# Patient Record
Sex: MALE | Race: ASIAN | NOT HISPANIC OR LATINO | Employment: OTHER | URBAN - METROPOLITAN AREA
[De-identification: names, ages, dates, MRNs, and addresses within clinical notes are randomized per-mention and may not be internally consistent; named-entity substitution may affect disease eponyms.]

---

## 2017-01-11 ENCOUNTER — GENERIC CONVERSION - ENCOUNTER (OUTPATIENT)
Dept: OTHER | Facility: OTHER | Age: 42
End: 2017-01-11

## 2017-01-11 DIAGNOSIS — Z13.1 ENCOUNTER FOR SCREENING FOR DIABETES MELLITUS: ICD-10-CM

## 2017-01-11 DIAGNOSIS — Z13.220 ENCOUNTER FOR SCREENING FOR LIPOID DISORDERS: ICD-10-CM

## 2017-02-24 ENCOUNTER — LAB CONVERSION - ENCOUNTER (OUTPATIENT)
Dept: OTHER | Facility: OTHER | Age: 42
End: 2017-02-24

## 2017-02-24 LAB
A/G RATIO (HISTORICAL): 1.7 (CALC) (ref 1–2.5)
ALBUMIN SERPL BCP-MCNC: 4.3 G/DL (ref 3.6–5.1)
ALP SERPL-CCNC: 75 U/L (ref 40–115)
ALT SERPL W P-5'-P-CCNC: 43 U/L (ref 9–46)
AST SERPL W P-5'-P-CCNC: 26 U/L (ref 10–40)
BILIRUB SERPL-MCNC: 0.6 MG/DL (ref 0.2–1.2)
BUN SERPL-MCNC: 11 MG/DL (ref 7–25)
BUN/CREA RATIO (HISTORICAL): ABNORMAL (CALC) (ref 6–22)
CALCIUM SERPL-MCNC: 9.5 MG/DL (ref 8.6–10.3)
CHLORIDE SERPL-SCNC: 102 MMOL/L (ref 98–110)
CHOLEST SERPL-MCNC: 153 MG/DL (ref 125–200)
CHOLEST/HDLC SERPL: 4.1 (CALC)
CO2 SERPL-SCNC: 29 MMOL/L (ref 20–31)
CREAT SERPL-MCNC: 0.76 MG/DL (ref 0.6–1.35)
EGFR AFRICAN AMERICAN (HISTORICAL): 130 ML/MIN/1.73M2
EGFR-AMERICAN CALC (HISTORICAL): 113 ML/MIN/1.73M2
GAMMA GLOBULIN (HISTORICAL): 2.6 G/DL (CALC) (ref 1.9–3.7)
GLUCOSE (HISTORICAL): 181 MG/DL (ref 65–99)
HDLC SERPL-MCNC: 37 MG/DL
LDL CHOLESTEROL (HISTORICAL): 94 MG/DL (CALC)
NON-HDL-CHOL (CHOL-HDL) (HISTORICAL): 116 MG/DL (CALC)
POTASSIUM SERPL-SCNC: 4.2 MMOL/L (ref 3.5–5.3)
SODIUM SERPL-SCNC: 139 MMOL/L (ref 135–146)
TOTAL PROTEIN (HISTORICAL): 6.9 G/DL (ref 6.1–8.1)
TRIGL SERPL-MCNC: 111 MG/DL

## 2017-02-28 ENCOUNTER — GENERIC CONVERSION - ENCOUNTER (OUTPATIENT)
Dept: OTHER | Facility: OTHER | Age: 42
End: 2017-02-28

## 2017-03-07 ENCOUNTER — ALLSCRIPTS OFFICE VISIT (OUTPATIENT)
Dept: OTHER | Facility: OTHER | Age: 42
End: 2017-03-07

## 2017-03-07 DIAGNOSIS — E11.65 TYPE 2 DIABETES MELLITUS WITH HYPERGLYCEMIA (HCC): ICD-10-CM

## 2017-03-07 LAB — GLUCOSE SERPL-MCNC: 327 MG/DL

## 2017-03-23 ENCOUNTER — GENERIC CONVERSION - ENCOUNTER (OUTPATIENT)
Dept: OTHER | Facility: OTHER | Age: 42
End: 2017-03-23

## 2017-11-09 ENCOUNTER — APPOINTMENT (OUTPATIENT)
Dept: LAB | Facility: CLINIC | Age: 42
End: 2017-11-09
Payer: COMMERCIAL

## 2017-11-09 ENCOUNTER — TRANSCRIBE ORDERS (OUTPATIENT)
Dept: LAB | Facility: CLINIC | Age: 42
End: 2017-11-09

## 2017-11-09 DIAGNOSIS — R73.03 DIABETES MELLITUS, LATENT: ICD-10-CM

## 2017-11-09 DIAGNOSIS — E11.8 UNCONTROLLED TYPE 2 DIABETES MELLITUS WITH COMPLICATION, UNSPECIFIED LONG TERM INSULIN USE STATUS: ICD-10-CM

## 2017-11-09 DIAGNOSIS — E11.65 UNCONTROLLED TYPE 2 DIABETES MELLITUS WITH COMPLICATION, UNSPECIFIED LONG TERM INSULIN USE STATUS: ICD-10-CM

## 2017-11-09 DIAGNOSIS — E11.65 TYPE 2 DIABETES MELLITUS WITH HYPERGLYCEMIA (HCC): ICD-10-CM

## 2017-11-09 DIAGNOSIS — R73.01 IMPAIRED FASTING GLUCOSE: Primary | ICD-10-CM

## 2017-11-09 DIAGNOSIS — R73.01 IMPAIRED FASTING GLUCOSE: ICD-10-CM

## 2017-11-09 LAB
CREAT UR-MCNC: 136 MG/DL
EST. AVERAGE GLUCOSE BLD GHB EST-MCNC: 134 MG/DL
HBA1C MFR BLD: 6.3 % (ref 4.2–6.3)
MICROALBUMIN UR-MCNC: 15.2 MG/L (ref 0–20)
MICROALBUMIN/CREAT 24H UR: 11 MG/G CREATININE (ref 0–30)
TSH SERPL DL<=0.05 MIU/L-ACNC: 1.69 UIU/ML (ref 0.36–3.74)

## 2017-11-09 PROCEDURE — 84443 ASSAY THYROID STIM HORMONE: CPT

## 2017-11-09 PROCEDURE — 83036 HEMOGLOBIN GLYCOSYLATED A1C: CPT

## 2017-11-09 PROCEDURE — 84681 ASSAY OF C-PEPTIDE: CPT

## 2017-11-09 PROCEDURE — 82043 UR ALBUMIN QUANTITATIVE: CPT | Performed by: INTERNAL MEDICINE

## 2017-11-09 PROCEDURE — 82570 ASSAY OF URINE CREATININE: CPT | Performed by: INTERNAL MEDICINE

## 2017-11-09 PROCEDURE — 36415 COLL VENOUS BLD VENIPUNCTURE: CPT

## 2017-11-10 LAB — C PEPTIDE SERPL-MCNC: 8.8 NG/ML (ref 1.1–4.4)

## 2017-11-14 ENCOUNTER — ALLSCRIPTS OFFICE VISIT (OUTPATIENT)
Dept: OTHER | Facility: OTHER | Age: 42
End: 2017-11-14

## 2017-11-14 DIAGNOSIS — E11.65 TYPE 2 DIABETES MELLITUS WITH HYPERGLYCEMIA (HCC): ICD-10-CM

## 2017-11-15 NOTE — PROGRESS NOTES
Assessment    1  Diet-controlled diabetes mellitus (250 00) (E11 9)    Plan  Diet-controlled diabetes mellitus    · Accu-Chek Combo KIT; USE AS DIRECTED  Uncontrolled diabetes mellitus    · (1) COMPREHENSIVE METABOLIC PANEL; Status:Active; Requested for:14Nov2017;    · (1) LIPID PANEL FASTING W DIRECT LDL REFLEX; Status:Active; Requested for:14Nov2017;    · *VB - Foot Exam; Status:Complete;   Done: 93OGE0233 08:56AM    Discussion/Summary  Discussion Summary:   1  fasting blood workrecommend flu vaccinereturn in March 2018  Counseling Documentation With Imm: The patient was counseled regarding diagnostic results,-- instructions for management,-- patient and family education,-- impressions,-- risks and benefits of treatment options,-- importance of compliance with treatment  Medication SE Review and Pt Understands Tx: Possible side effects of new medications were reviewed with the patient/guardian today  The treatment plan was reviewed with the patient/guardian  The patient/guardian understands and agrees with the treatment plan      Chief Complaint  Chief Complaint Chronic Condition [facility]: Patient is here today for follow up of chronic conditions described in HPI  History of Present Illness  HPI: 35 y/o M here for follow up  above - taking no prescribed meds on regular basisseen 8 mos ago when had BS in 300s in [place] return to office or complete BW till now, reports he was traveling for work during last several mos  BS at home and running in 100-110staking metformin, lost 10-15 lbs since last OV with meand modified his diet, eating a 'heavy fat diet'  eye exam scheduled for next weekreviewed most recent BW today in office  takes several ayurvedic/herbal medications OTC but he did not specify which ones  flu vaccine and prefers not to take any prescribed medicationsother complaints      Review of Systems  Complete-Male:  Constitutional: no fever  Eyes: eyesight problems-- and-- wears glasses    ENT: no sore throat  Cardiovascular: no chest pain  Respiratory: no shortness of breath  Gastrointestinal: no abdominal pain  Genitourinary: no dysuria  Musculoskeletal: no arthralgias  Integumentary: no rashes  Neurological: no confusion  Psychiatric: no depression  Endocrine: no feelings of weakness  ROS Reviewed:   ROS reviewed  Active Problems    1  Acute pharyngitis (462) (J02 9)   2  Acute upper respiratory infection (465 9) (J06 9)   3  Chronic allergic conjunctivitis (372 14) (H10 45)   4  Daytime sleepiness (780 54) (R40 0)   5  Elevated fasting blood sugar (790 21) (R73 01)   6  Female infertility (628 9) (N97 9)   7  Ganglion (727 43) (M67 40)   8  Hemorrhoids (455 6) (K64 9)   9  Joint pain, knee (719 46) (M25 569)   10  Laboratory examination ordered as part of a routine general medical examination (V72 62) (Z00 00)   11  Left ear pain (388 70) (H92 02)   12  Left wrist injury (959 3) (S69 92XA)   13  Otalgia, unspecified laterality (388 70) (H92 09)   14  Postnasal drip (784 91) (R09 82)   15  Screening for diabetes mellitus (V77 1) (Z13 1)   16  Screening for hyperlipidemia (V77 91) (Z13 220)   17  Skin lesion (709 9) (L98 9)   18  Snoring (786 09) (R06 83)   19  Sore throat (462) (J02 9)   20  Well adult on routine health check (V70 0) (Z00 00)    Past Medical History  1  No pertinent past medical history  Active Problems And Past Medical History Reviewed: The active problems and past medical history were reviewed and updated today  Surgical History  1  Denied: History of Recent Surgery    Family History  Mother    1  Family history of diabetes mellitus (V18 0) (Z83 3)   2  Family history of heart disease (V17 49) (Z82 49)  Father    3  Family history of cerebrovascular accident (V17 1) (Z82 3)   4  Family history of diabetes mellitus (V18 0) (Z83 3)   5   Family history of heart disease (V17 49) (Z82 49)    Social History     · Caffeine use (V49 89) (F15 90)   · Currently working   · Exercises regularly   ·    · Never a smoker  Social History Reviewed: The social history was reviewed and updated today  Current Meds   1  Multi-Vitamin Oral Tablet; TAKE 1 TABLET DAILY; Therapy: [id number] to Recorded  Medication List Reviewed: The medication list was reviewed and updated today  Allergies  1  No Known Drug Allergies    Vitals  Vital Signs    Recorded: 20OUV5633 08:51AM   Temperature 98 F   Heart Rate 94   Respiration 18   Systolic 446   Diastolic 84   Height 5 ft 4 in   Weight 152 lb    BMI Calculated 26 09   BSA Calculated 1 74   O2 Saturation 98       Physical Exam  Socks and shoes removed, Right Foot Findings: normal foot  The toes were normal  Socks and Shoes removed, Left Foot Findings: normal foot  The toes were normal    Monofilament Testing: Tactile sensation in the right foot (see image for location): number 1 was normal,-- number 4 was normal,-- number 5 was normal,-- number 6 was normal,-- number 2 was normal,-- number 3 was normal,-- number 7 was normal,-- number 8 was normal,-- number 9 was normal-- and-- number 10 was normal  Tactile sensation in the left foot (see image for location): number 1 was normal,-- number 4 was normal,-- number 5 was normal,-- number 6 was normal,-- number 2 was normal,-- number 3 was normal,-- number 7 was normal,-- number 8 was normal,-- number 9 was normal-- and-- number 10 was normal    Vascular: Pulses: 2+ in the posterior tibialis-- and-- 2+ in the dorsalis pedis  Pulses: 2+ in the posterior tibialis-- and-- 2+ in the dorsalis pedis  Assign Risk Category: 0: No loss of protective sensation, no deformity  No present risk  Constitutional  General appearance: No acute distress, well appearing and well nourished  overweight  Eyes  Pupils and irises: Equal, round, reactive to light  Ears, Nose, Mouth, and Throat  Otoscopic examination: Tympanic membranes translucent with normal light reflex  Canals patent without erythema  Oropharynx: Normal with no erythema, edema, exudate or lesions  Pulmonary  Respiratory effort: No increased work of breathing or signs of respiratory distress  Auscultation of lungs: Clear to auscultation  Cardiovascular  Auscultation of heart: Normal rate and rhythm, normal S1 and S2, no murmurs  Examination of extremities for edema and/or varicosities: Normal    Abdomen  Abdomen: Non-tender, no masses  Psychiatric  Judgment and insight: Normal    Mood and affect: Normal        Results/Data  *VB - Foot Exam [id number] 08:56AM Gisselle Rodas     Test Name Result Flag Reference   FOOT EXAM [id number]       (1) TSH WITH FT4 REFLEX [id number] 04:50PM Gisselle Rodas   TW Order Number: [id number]     Test Name Result Flag Reference   TSH 1 690 uIU/mL  0 358-3 740   Patients undergoing fluorescein dye angiography may retain small amounts of fluorescein in the body for 48-72 hours post procedure  Samples containing fluorescein can produce falsely depressed TSH values  If the patient had this procedure,a specimen should be resubmitted post fluorescein clearance           Provider Comments  Provider Comments:   he requests re-check of FLP and CMP  checking BS at home and if in good range, f/u 3/2018 or prn      Signatures   Electronically signed by : Kathryn Dumont DO; Nov 14 2017 10:06AM EST                       (Author)

## 2018-01-11 NOTE — RESULT NOTES
Verified Results  (1) TSH WITH FT4 REFLEX [id number] 04:50PM Alba Joseph   TW Order Number: [id number]     Test Name Result Flag Reference   TSH 1 690 uIU/mL  0 358-3 740   Patients undergoing fluorescein dye angiography may retain small amounts of fluorescein in the body for 48-72 hours post procedure  Samples containing fluorescein can produce falsely depressed TSH values  If the patient had this procedure,a specimen should be resubmitted post fluorescein clearance  (1) HEMOGLOBIN A1C [id number] 04:50PM Alba Joseph     Test Name Result Flag Reference   HEMOGLOBIN A1C 6 3 %  4 2-6 3   EST  AVG  GLUCOSE 134 mg/dl       (1) MICROALBUMIN CREATININE RATIO, RANDOM URINE [id number] 04:50PM Alba Joseph     Test Name Result Flag Reference   MICROALBUMIN/ CREAT R 11 mg/g creatinine  0-30   MICROALBUMIN,URINE 15 2 mg/L  0 0-20 0   CREATININE URINE 136 0 mg/dL       (1) C-PEPTIDE [id number] 04:50PM Alba Joseph     Test Name Result Flag Reference   C PEPTIDE 8 8 ng/mL H 1 1 - 4 4   C-Peptide reference interval is for fasting patients  Performed at:  [address] [place] [address] [employer] [address]  [id number]  : Tiny Velasco MD, Phone:  [phone]       Plan  Diet-controlled diabetes mellitus    · Accu-Chek Combo KIT; USE AS DIRECTED  Uncontrolled diabetes mellitus    · (1) COMPREHENSIVE METABOLIC PANEL; Status:Active; Requested for:14Nov2017;    · (1) LIPID PANEL FASTING W DIRECT LDL REFLEX; Status:Active;  Requested  for:14Nov2017;    · *VB - Foot Exam; Status:Complete;   Done: 60XAF8309 08:56AM

## 2018-01-12 NOTE — MISCELLANEOUS
Message   Recorded as Task   Date: 01/11/2017 10:55 AM, Created By: Reed Dale   Task Name: Follow Up   Assigned To: Jesus Bruno   Regarding Patient: Yaneth Smith, Status: Active   Comment:    Aelah Oleary - 11 Jan 2017 10:55 AM     TASK CREATED  Caller: Self; General Medical Question; [phone] (Home)  pt has a physcial coming up with you and would like a script for bloodwork prior to the appt  please advise  call pt at [phone]  Screening for diabetes mellitus, Screening for hyperlipidemia    · (1) COMPREHENSIVE METABOLIC PANEL; Status:Active; Requested WXV:[id number];    · (1) LIPID PANEL FASTING W DIRECT LDL REFLEX; Status:Active;  Requested  LVD:[id number];     Signatures   Electronically signed by : Anya Dawn DO; Jan 11 2017  1:07PM EST                       (Author)

## 2018-01-12 NOTE — PROGRESS NOTES
Assessment    1  Encounter for preventive health examination (V70 0) (Z00 00)   2  Elevated fasting blood sugar (790 21) (R73 01)   3  Daytime sleepiness (780 54) (R40 0)   4  Snoring (786 09) (R06 83)   5  Uncontrolled diabetes mellitus (250 02) (E11 65)    Plan  Elevated fasting blood sugar, Pre-diabetes    · (1) HEMOGLOBIN A1C; Status:Active; Requested for:07Mar2017;   Elevated fasting blood sugar, Uncontrolled diabetes mellitus    · (1) MICROALBUMIN CREATININE RATIO, RANDOM URINE; Status:Active; Requested  for:07Mar2017;   FamHx: Family history of diabetes mellitus, Uncontrolled diabetes mellitus    · 1 - Tammy SAUNDERS, Tiara Saenz (Endocrinology) Physician Referral  evaluate and treat  Status:  Active  Requested for: 10UVW9169  Care Summary provided  : Yes  Pre-diabetes    · Blood Glucose- POC; Status:Active - Perform Order; Requested for:07Mar2017;   Fasting (Y/N) : No - N  Uncontrolled diabetes mellitus    · MetFORMIN HCl - 500 MG Oral Tablet; TAKE ONE TABLET EVERY 12 HOURS 30  MINUTES BEFORE BREAKFASTAND 30 MINUTES BEFORE DINNER FOR 3 DAYS,  THEN INCREASE DOSE TO 2 TABLETS BID   · (1) C-PEPTIDE; Status:Active; Requested for:07Mar2017;    · (1) TSH WITH FT4 REFLEX; Status:Active; Requested for:07Mar2017;     Discussion/Summary  Impression: health maintenance visit  Currently, he has an inadequate exercise regimen  Prostate cancer screening: PSA is not indicated  Screening lab work includes glucose and lipid profile  The risks and benefits of immunizations were discussed, immunizations are needed and patient declines immunizations  Advice and education were given regarding nutrition and weight loss  Patient discussion: discussed with the patient  1  blood work today  2  try to have dedicated 7-8 hours for sleep  3   check blood sugars in morning (goal<130) OR 2 hours after meals such as dinner (goal <150)  4  endocrinology appointment  5  return in 1 week with home blood sugar readings  6  start metformin twandrae a day for 2-3 days(WITH MEALS) and then increase dose to 2 tablets twice a day  7  CALL OFFICE IF BLOOD SUGARS >300 OR IF YOU FEEL SYMPTOMS/WORSE  Possible side effects of new medications were reviewed with the patient/guardian today  The treatment plan was reviewed with the patient/guardian  The patient/guardian understands and agrees with the treatment plan   The patient was counseled regarding diagnostic results, instructions for management, patient and family education, impressions  total time of encounter was 45 minutes and >50% minutes was spent counseling  Chief Complaint  patient is here for a physical, also wanted to discuss having a sleep test done because he is waking up a lot at night      History of Present Illness  HM, Adult Male: The patient is being seen for a health maintenance evaluation  The last health maintenance visit was ~2 year(s) ago  Social History: Household members include spouse  He is   Work status: working full time  The patient has never smoked cigarettes  The patient has no concerns about alcohol abuse  General Health: The patient's health since the last visit is described as good  He denies vision problems  He denies hearing loss  Immunizations status: not up to date The patient needs the following immunization(s): influenza vaccine  Lifestyle:  He consumes a diverse and healthy diet  He exercises regularly  Exercise includes swimming, strength training and but not exercising last 3-4 months  He does not use tobacco    Screening: cancer screening reviewed and current  metabolic screening reviewed and updated  HPI: 41 y/o M here for annual check up/physical    as above - takes no medications and denies any symptoms or complaints  we had been trying to contact Marlee to come in for lab results review 2nd to elevated FBS, but he did not call us back until he scheduled appt today      Reviewed most recent blood tests results with patient, he is not fasting today/had breakfast about 1 hour ago([place]an rice puffs, coffee, and [place]an sweets)  significant fhx DM, BS In office now is 327    declines flu vaccine  advised BS is diagnostic of DM and that he will likely need medication to treat, he prefers not to take medication and refusing to start 'for another 2 weeks to see if I can improve BS on my own'    bp mildly elevated today as well  reports daytime sleepiness and snoring  goes to bed at 1am and 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  feels tired since on this schedule, denies apneic events, RLS, back pain, nocturia or leg pains at nighttime    no other complaints      Review of Systems    Constitutional: no fever  Eyes: eyesight problems and wears glasses  ENT: no sore throat  Cardiovascular: no chest pain  Respiratory: no shortness of breath  Gastrointestinal: no abdominal pain  Genitourinary: no dysuria  Musculoskeletal: no arthralgias and no myalgias  Integumentary: no rashes  Neurological: no confusion  Psychiatric: no depression  Endocrine: no feelings of weakness  Hematologic/Lymphatic: no tendency for easy bleeding and no tendency for easy bruising  ROS reviewed  Active Problems    1  Acute pharyngitis (462) (J02 9)   2  Chronic allergic conjunctivitis (372 14) (H10 45)   3  Female infertility (628 9) (N97 9)   4  Ganglion (727 43) (M67 40)   5  Hemorrhoids (455 6) (K64 9)   6  Joint pain, knee (719 46) (M25 569)   7  Laboratory examination ordered as part of a routine general medical examination   (V72 62) (Z00 00)   8  Left ear pain (388 70) (H92 02)   9  Left wrist injury (959 3) (S69 92XA)   10  Otalgia, unspecified laterality (388 70) (H92 09)   11  Postnasal drip (784 91) (R09 82)   12  Screening for diabetes mellitus (V77 1) (Z13 1)   13  Screening for hyperlipidemia (V77 91) (Z13 220)   14  Skin lesion (709 9) (L98 9)   15  Sore throat (462) (J02 9)   16   Well adult on routine health check (V70 0) (Z00 00)    Past Medical History    · No pertinent past medical history    Surgical History    · Denied: History of Recent Surgery    Family History  Mother    · Family history of diabetes mellitus (V18 0) (Z83 3)   · Family history of heart disease (V17 49) (Z80 55)  Father    · Family history of cerebrovascular accident (V17 1) (Z82 3)   · Family history of diabetes mellitus (V18 0) (Z83 3)   · Family history of heart disease (V17 49) (Z82 49)    Social History    · Caffeine use (V49 89) (F15 90)   · 1 cup per day  · Currently working   · , works at home   · Exercises regularly   ·    · Never a smoker    Current Meds   1  Multi-Vitamin Oral Tablet; TAKE 1 TABLET DAILY; Therapy: [id number] to Recorded    Allergies    1  No Known Drug Allergies    Vitals   Recorded: 73ARG6425 11:18AM Recorded: 84ZQN4220 10:52AM   Temperature  98 2 F    Heart Rate   96   Respiration   18   Systolic 723  925   Diastolic 90  92   Height   5 ft 4 in   Weight   163 lb    BMI Calculated   27 98   BSA Calculated   1 8   O2 Saturation   98     Physical Exam    Constitutional   General appearance: No acute distress, well appearing and well nourished  Eyes   Pupils and irises: Equal, round, reactive to light  Ears, Nose, Mouth, and Throat   Oropharynx: Normal with no erythema, edema, exudate or lesions  Pulmonary   Respiratory effort: No increased work of breathing or signs of respiratory distress  Auscultation of lungs: Clear to auscultation  Cardiovascular   Auscultation of heart: Normal rate and rhythm, normal S1 and S2, no murmurs  Examination of extremities for edema and/or varicosities: Normal   no edema  Abdomen   Abdomen: Non-tender, no masses      Psychiatric   Judgment and insight: Normal     Mood and affect: Normal        Results/Data  PHQ-2 Adult Depression Screening 56QVN4744 01:59PM User, [employer] [facility]     Test Name Result Flag Reference   PHQ-2 Adult Depression Score 0     Over the last two weeks, how often have you been bothered by any of the following problems? Little interest or pleasure in doing things: Not at all - 0  Feeling down, depressed, or hopeless: Not at all - 0   PHQ-2 Adult Depression Screening Negative       STOP BANG Questionnaire 26DOX2073 11:05AM Tahir Tiffani     Test Name Result Flag Reference   STOP BANG Questionnaire Risk of ALFIE Intermediate Risk     Snoring: Yes  Tired: Yes  Observed: No  Blood Pressure: No  BMI: No  Age: No  Neck Circumference: No  Gender: Yes   STOP BANG Questionnaire ALFIE Total Score 3     Snoring: Yes  Tired: Yes  Observed: No  Blood Pressure: No  BMI: No  Age: No  Neck Circumference: No  Gender: Yes     STOP BANG Questionnaire [id number] 11:05AM Tahir Tiffani     Test Name Result Flag Reference   STOP BANG Questionnaire Risk of ALFIE Intermediate Risk     Snoring: Yes  Tired: Yes  Observed: No  Blood Pressure: No  BMI: No  Age: No  Neck Circumference: No  Gender: Yes   STOP BANG Questionnaire ALFIE Total Score 3     Snoring: Yes  Tired: Yes  Observed: No  Blood Pressure: No  BMI: No  Age: No  Neck Circumference: No  Gender: Yes     (1) COMPREHENSIVE METABOLIC PANEL 63DAV0081 90:91XV Tahir Tiffani   REPORT COMMENT:  ALSO REQ # [id number]  FASTING:YES     Test Name Result Flag Reference   GLUCOSE 181 mg/dL H 65-99   Fasting reference interval   UREA NITROGEN (BUN) 11 mg/dL  7-25   CREATININE 0 76 mg/dL  0 60-1 35   eGFR NON-AFR   AMERICAN 113 mL/min/1 73m2  > OR = 60   eGFR AFRICAN AMERICAN 130 mL/min/1 73m2  > OR = 60   BUN/CREATININE RATIO   2-26   NOT APPLICABLE (calc)   SODIUM 139 mmol/L  135-146   POTASSIUM 4 2 mmol/L  3 5-5 3   CHLORIDE 102 mmol/L     CARBON DIOXIDE 29 mmol/L  20-31   CALCIUM 9 5 mg/dL  8 6-10 3   PROTEIN, TOTAL 6 9 g/dL  6 1-8 1   ALBUMIN 4 3 g/dL  3 6-5 1   GLOBULIN 2 6 g/dL (calc)  1 9-3 7   ALBUMIN/GLOBULIN RATIO 1 7 (calc)  1 0-2 5   BILIRUBIN, TOTAL 0 6 mg/dL  0 2-1 2   ALKALINE PHOSPHATASE 75 U/L     AST 26 U/L  10-40   ALT 43 U/L 9-46     (Q) LIPID PANEL WITH REFLEX TO DIRECT LDL [id number] 11:26AM Joaquín Maldonado     Test Name Result Flag Reference   CHOLESTEROL, TOTAL 153 mg/dL  125-200   HDL CHOLESTEROL 37 mg/dL L > OR = 40   TRIGLICERIDES 062 mg/dL  <150   LDL-CHOLESTEROL 94 mg/dL (calc)  <130   Desirable range <100 mg/dL for patients with CHD or  diabetes and <70 mg/dL for diabetic patients with  known heart disease  CHOL/HDLC RATIO 4 1 (calc)  < OR = 5 0   NON HDL CHOLESTEROL 116 mg/dL (calc)     Target for non-HDL cholesterol is 30 mg/dL higher than   LDL cholesterol target         Provider Comments  Provider Comments:   pt asymptomatic -denies polydipsia or polyuria    does not want to take med for DM  advised if further BW shows very high BS - he may need insulin  glucometer provided today in office with teaching    f/u 1 week with me    pt also requests sleep study as he believes he has ALFIE, STOP BANG score of 3  Eppworth Sleep score of 6  advised to give adequate time for sleep to see if rested first, if not improved - sleep center eval      Future Appointments    Date/Time Provider Specialty Site   03/14/2017 09:00 AM Joaquín Maldonado DO Internal Medicine [address] [facility]   Electronically signed by : Kolby Bertrand DO; Mar  7 2017  4:22PM EST                       (Author)

## 2018-01-13 NOTE — RESULT NOTES
Message   blood test results are back  cholesterol looks good at 153 and kidney/liver blood work looks fine  blood sugar is elevated, however to 181  Recommend to returnt to office for appointment & while fasting for 12 hours so we can re-check fasting blood sugar and add'l blood work, thx     Verified Results  (1) 100 Kendrick Pereira [id number] 78:87XK Michellechano Hopkins   REPORT COMMENT:  ALSO REQ # [id number]  FASTING:YES     Test Name Result Flag Reference   GLUCOSE 181 mg/dL H 65-99   Fasting reference interval   UREA NITROGEN (BUN) 11 mg/dL  7-25   CREATININE 0 76 mg/dL  0 60-1 35   eGFR NON-AFR  AMERICAN 113 mL/min/1 73m2  > OR = 60   eGFR AFRICAN AMERICAN 130 mL/min/1 73m2  > OR = 60   BUN/CREATININE RATIO   1-99   NOT APPLICABLE (calc)   SODIUM 139 mmol/L  135-146   POTASSIUM 4 2 mmol/L  3 5-5 3   CHLORIDE 102 mmol/L     CARBON DIOXIDE 29 mmol/L  20-31   CALCIUM 9 5 mg/dL  8 6-10 3   PROTEIN, TOTAL 6 9 g/dL  6 1-8 1   ALBUMIN 4 3 g/dL  3 6-5 1   GLOBULIN 2 6 g/dL (calc)  1 9-3 7   ALBUMIN/GLOBULIN RATIO 1 7 (calc)  1 0-2 5   BILIRUBIN, TOTAL 0 6 mg/dL  0 2-1 2   ALKALINE PHOSPHATASE 75 U/L     AST 26 U/L  10-40   ALT 43 U/L  9-46     (Q) LIPID PANEL WITH REFLEX TO DIRECT LDL [id number] 11:26AM Warrne Hopkins     Test Name Result Flag Reference   CHOLESTEROL, TOTAL 153 mg/dL  125-200   HDL CHOLESTEROL 37 mg/dL L > OR = 40   TRIGLICERIDES 599 mg/dL  <150   LDL-CHOLESTEROL 94 mg/dL (calc)  <130   Desirable range <100 mg/dL for patients with CHD or  diabetes and <70 mg/dL for diabetic patients with  known heart disease  CHOL/HDLC RATIO 4 1 (calc)  < OR = 5 0   NON HDL CHOLESTEROL 116 mg/dL (calc)     Target for non-HDL cholesterol is 30 mg/dL higher than   LDL cholesterol target

## 2018-01-14 VITALS
DIASTOLIC BLOOD PRESSURE: 84 MMHG | WEIGHT: 152 LBS | HEART RATE: 94 BPM | RESPIRATION RATE: 18 BRPM | TEMPERATURE: 98 F | HEIGHT: 64 IN | BODY MASS INDEX: 25.95 KG/M2 | OXYGEN SATURATION: 98 % | SYSTOLIC BLOOD PRESSURE: 118 MMHG

## 2018-01-14 VITALS
HEART RATE: 96 BPM | RESPIRATION RATE: 18 BRPM | BODY MASS INDEX: 27.83 KG/M2 | WEIGHT: 163 LBS | OXYGEN SATURATION: 98 % | HEIGHT: 64 IN | SYSTOLIC BLOOD PRESSURE: 124 MMHG | TEMPERATURE: 98.2 F | DIASTOLIC BLOOD PRESSURE: 90 MMHG

## 2018-01-17 NOTE — MISCELLANEOUS
Message   Recorded as Task   Date: 03/23/2017 09:38 AM, Created By: Adele Gray   Task Name: Intake   Assigned To: Jesus Bruno   Regarding Patient: Filomena Cervantes, Status: In Progress   Comment:    Jackelin Gallegos - 23 Mar 2017 9:38 AM     TASK CREATED  Called patient per Dr Foley [place] request to have him reschedule for today as he needs to be seen  patient no showed for appointment today at 9:30 AM   Jackelin Gallegos - 23 Mar 2017 11:19 AM     TASK EDITED  pt called  cancelled appointment  is out of town and will call back to schedule     my office called patient 2-3 times since here for appt 3/7 including to come in for appt today(previously canceled his f/u appt on 3/14) but he has not come back to office or complied with my recommendations for medications(see previous task - he is not taking metformin)      Signatures   Electronically signed by : Karen Hammond DO; Mar 23 2017 11:57AM EST                       (Author)

## 2019-01-08 ENCOUNTER — TELEPHONE (OUTPATIENT)
Dept: INTERNAL MEDICINE CLINIC | Facility: CLINIC | Age: 44
End: 2019-01-08

## 2019-01-08 DIAGNOSIS — E11.9 DIET-CONTROLLED DIABETES MELLITUS (HCC): Primary | ICD-10-CM

## 2019-01-08 NOTE — TELEPHONE ENCOUNTER
pls advise Marlee    He needs to complete fasting BW prior to his upcoming appt for diabetes    BW & office visits need to be completed at least every 6 months    If he does not complete BW before upcoming appt, he will have to re-schedule    Thank you

## 2019-01-25 ENCOUNTER — TELEPHONE (OUTPATIENT)
Dept: INTERNAL MEDICINE CLINIC | Facility: CLINIC | Age: 44
End: 2019-01-25

## 2019-01-25 NOTE — TELEPHONE ENCOUNTER
lmom to cb     Patient is due for appt with Dr Neli Newton, Please schedule  Patient has BW ordered that needs to be completed prior to appt with Dr Neli Newton     DM foot exam   Patient is due for annual diabetic foot exam- has patient had this done within the past year? If so where and when? If not will do at next appt  DM eye exam   Patient is due for annual diabetic eye exam  Has patient had this done with in the past year? If so when and where?  If not does patient need referral for eye dr

## 2019-04-18 ENCOUNTER — TELEPHONE (OUTPATIENT)
Dept: INTERNAL MEDICINE CLINIC | Facility: CLINIC | Age: 44
End: 2019-04-18

## 2019-06-13 ENCOUNTER — TELEPHONE (OUTPATIENT)
Dept: INTERNAL MEDICINE CLINIC | Facility: CLINIC | Age: 44
End: 2019-06-13

## 2021-04-08 DIAGNOSIS — Z23 ENCOUNTER FOR IMMUNIZATION: ICD-10-CM

## 2022-01-01 ENCOUNTER — NURSE TRIAGE (OUTPATIENT)
Dept: OTHER | Facility: OTHER | Age: 47
End: 2022-01-01

## 2022-01-01 DIAGNOSIS — Z20.822 COVID-19 RULED OUT: Primary | ICD-10-CM

## 2022-01-02 PROCEDURE — U0005 INFEC AGEN DETEC AMPLI PROBE: HCPCS | Performed by: FAMILY MEDICINE

## 2022-01-02 PROCEDURE — U0003 INFECTIOUS AGENT DETECTION BY NUCLEIC ACID (DNA OR RNA); SEVERE ACUTE RESPIRATORY SYNDROME CORONAVIRUS 2 (SARS-COV-2) (CORONAVIRUS DISEASE [COVID-19]), AMPLIFIED PROBE TECHNIQUE, MAKING USE OF HIGH THROUGHPUT TECHNOLOGIES AS DESCRIBED BY CMS-2020-01-R: HCPCS | Performed by: FAMILY MEDICINE

## 2022-01-02 NOTE — TELEPHONE ENCOUNTER
Regarding: COVID    Symptomatic    headache, body pain  ----- Message from Ben Andrea sent at 1/1/2022  5:01 PM EST -----  "I have been exposed to a COVID positive person and I am now having a headache, sore throat, and body pain "

## 2022-01-02 NOTE — TELEPHONE ENCOUNTER
Reason for Disposition   [1] COVID-19 infection suspected by caller or triager AND [2] mild symptoms (cough, fever, or others) AND [3] has not gotten tested yet    Answer Assessment - Initial Assessment Questions  1  COVID-19 DIAGNOSIS: "Who made your COVID-19 diagnosis?" "Was it confirmed by a positive lab test?" If not diagnosed by a HCP, ask "Are there lots of cases (community spread) where you live?" Note: See public health department website, if unsure  Direct exposure and symptomatic son was exposed and got sick   Symptoms runy nose sore throat body aches no fever Denies SOB chest pain or chest pain  2  COVID-19 EXPOSURE: "Was there any known exposure to COVID before the symptoms began?" CDC Definition of close contact: within 6 feet (2 meters) for a total of 15 minutes or more over a 24-hour period  Yes  3  ONSET: "When did the COVID-19 symptoms start?"       12 31  4  WORST SYMPTOM: "What is your worst symptom?" (e g , cough, fever, shortness of breath, muscle aches)      cough  5  COUGH: "Do you have a cough?" If Yes, ask: "How bad is the cough?"      yes  6  FEVER: "Do you have a fever?" If Yes, ask: "What is your temperature, how was it measured, and when did it start?"      denies  7  RESPIRATORY STATUS: "Describe your breathing?" (e g , shortness of breath, wheezing, unable to speak)      WNL  8  BETTER-SAME-WORSE: "Are you getting better, staying the same or getting worse compared to yesterday?"  If getting worse, ask, "In what way?"     same  9  HIGH RISK DISEASE: "Do you have any chronic medical problems?" (e g , asthma, heart or lung disease, weak immune system, obesity, etc )     Diabetes  10  VACCINE: "Have you gotten the COVID-19 vaccine?" If Yes ask: "Which one, how many shots, when did you get it?"       Yes and boosted  11  PREGNANCY: "Is there any chance you are pregnant?" "When was your last menstrual period?"       NA  12   OTHER SYMPTOMS: "Do you have any other symptoms?" (e g , chills, fatigue, headache, loss of smell or taste, muscle pain, sore throat; new loss of smell or taste especially support the diagnosis of COVID-19)       No    Protocols used: CORONAVIRUS (COVID-19) DIAGNOSED OR SUSPECTED-ADULT-

## 2022-01-03 ENCOUNTER — TELEPHONE (OUTPATIENT)
Dept: OTHER | Facility: OTHER | Age: 47
End: 2022-01-03

## 2025-03-31 ENCOUNTER — OFFICE VISIT (OUTPATIENT)
Dept: FAMILY MEDICINE CLINIC | Facility: CLINIC | Age: 50
End: 2025-03-31
Payer: COMMERCIAL

## 2025-03-31 ENCOUNTER — RESULTS FOLLOW-UP (OUTPATIENT)
Dept: FAMILY MEDICINE CLINIC | Facility: CLINIC | Age: 50
End: 2025-03-31

## 2025-03-31 VITALS
HEIGHT: 64 IN | WEIGHT: 153 LBS | SYSTOLIC BLOOD PRESSURE: 125 MMHG | OXYGEN SATURATION: 98 % | HEART RATE: 97 BPM | DIASTOLIC BLOOD PRESSURE: 80 MMHG | BODY MASS INDEX: 26.12 KG/M2

## 2025-03-31 DIAGNOSIS — E11.9 TYPE 2 DIABETES MELLITUS WITHOUT COMPLICATION, WITHOUT LONG-TERM CURRENT USE OF INSULIN (HCC): ICD-10-CM

## 2025-03-31 DIAGNOSIS — Z00.00 ANNUAL PHYSICAL EXAM: Primary | ICD-10-CM

## 2025-03-31 DIAGNOSIS — R03.0 ELEVATED BLOOD PRESSURE READING: ICD-10-CM

## 2025-03-31 DIAGNOSIS — Z13.0 SCREENING FOR DEFICIENCY ANEMIA: ICD-10-CM

## 2025-03-31 DIAGNOSIS — Z13.220 SCREENING FOR HYPERLIPIDEMIA: ICD-10-CM

## 2025-03-31 DIAGNOSIS — H81.11 BENIGN PAROXYSMAL POSITIONAL VERTIGO OF RIGHT EAR: ICD-10-CM

## 2025-03-31 DIAGNOSIS — Z23 ENCOUNTER FOR IMMUNIZATION: ICD-10-CM

## 2025-03-31 DIAGNOSIS — Z12.11 ENCOUNTER FOR SCREENING COLONOSCOPY: ICD-10-CM

## 2025-03-31 PROBLEM — R73.01 ELEVATED FASTING BLOOD SUGAR: Status: ACTIVE | Noted: 2017-03-07

## 2025-03-31 LAB
LEFT EYE DIABETIC RETINOPATHY: ABNORMAL
LEFT EYE IMAGE QUALITY: ABNORMAL
LEFT EYE MACULAR EDEMA: ABNORMAL
LEFT EYE OTHER RETINOPATHY: ABNORMAL
RIGHT EYE DIABETIC RETINOPATHY: ABNORMAL
RIGHT EYE IMAGE QUALITY: ABNORMAL
RIGHT EYE MACULAR EDEMA: ABNORMAL
RIGHT EYE OTHER RETINOPATHY: ABNORMAL
SEVERITY (EYE EXAM): ABNORMAL
SL AMB POCT HEMOGLOBIN AIC: 9.4 (ref ?–6.5)

## 2025-03-31 PROCEDURE — 99204 OFFICE O/P NEW MOD 45 MIN: CPT

## 2025-03-31 PROCEDURE — 83036 HEMOGLOBIN GLYCOSYLATED A1C: CPT

## 2025-03-31 NOTE — ASSESSMENT & PLAN NOTE
Lab Results   Component Value Date    HGBA1C 9.4 (A) 03/31/2025     Orders:    POCT hemoglobin A1c    Diabetic foot exam; Future    IRIS Diabetic eye exam    Ambulatory Referral to Endocrinology; Future    Hemoglobin A1C; Future    Comprehensive metabolic panel; Future    POC A1C 9.4 - patient adamant that this will improve with Keto diet because he noticed since starting it back up within the past 2 weeks his BG has improved - noticed on his continuous monitor. Will re-check in 3 months, patient will consider medication at follow-up if still elevated.

## 2025-03-31 NOTE — PROGRESS NOTES
Adult Annual Physical  Name: Marlee Cody      : 1975      MRN: [id number]  Encounter Provider: ANKUSH Garcia  Encounter Date: 3/31/2025   Encounter department: [facility]    Assessment & Plan  Encounter for immunization  In need of tdap and shingrex - declining at this time.        Type 2 diabetes mellitus without complication, without long-term current use of insulin ([facility])    Lab Results   Component Value Date    HGBA1C 9.4 (A) 2025     Orders:    POCT hemoglobin A1c    Diabetic foot exam; Future    IRIS Diabetic eye exam    Ambulatory Referral to Endocrinology; Future    Hemoglobin A1C; Future    Comprehensive metabolic panel; Future    POC A1C 9.4 - patient adamant that this will improve with Keto diet because he noticed since starting it back up within the past 2 weeks his BG has improved - noticed on his continuous monitor. Will re-check in 3 months, patient will consider medication at follow-up if still elevated.   Screening for hyperlipidemia  In setting of diabetes, lipid panel ordered.  Orders:    Lipid Panel with Direct LDL reflex; Future    Screening for deficiency anemia  No prior known history, labs ordered.  Orders:    CBC and differential; Future    Elevated blood pressure reading  Managed with diet/exercise.   Orders:    Albumin / creatinine urine ratio; Future    Encounter for screening colonoscopy  [facility]. Referral to GI for EGD/colonoscopy.  Orders:    Ambulatory Referral to Gastroenterology; Future    Benign paroxysmal positional vertigo of right ear  Offered meclizine - patient declined. Referral to PT for further management.  Orders:    Ambulatory Referral to Physical Therapy; Future    Annual physical exam  Routine blood work ordered. Referral to GI to screen for colonoscopy. Declining immunizations.          Preventive Screenings:  - Diabetes Screening: has diabetes and orders placed  - Cholesterol Screening:  orders placed   - Hepatitis C screening: patient declines   - HIV screening: patient declines   - Colon cancer screening: orders placed   - Lung cancer screening: screening not indicated   - Prostate cancer screening: screening not indicated     Immunizations:  - Immunizations due: Tdap    Counseling/Anticipatory Guidance:  - Alcohol: discussed moderation in alcohol intake and recommendations for healthy alcohol use.   - Drug use: discussed harms of illicit drug use and how it can negatively impact mental/physical health.   - Tobacco use: discussed harms of tobacco use and management options for quitting.   - Dental health: discussed importance of regular tooth brushing, flossing, and dental visits.   - Sexual health: discussed sexually transmitted diseases, partner selection, use of condoms, avoidance of unintended pregnancy, and contraceptive alternatives.   - Diet: discussed recommendations for a healthy/well-balanced diet.   - Exercise: the importance of regular exercise/physical activity was discussed. Recommend exercise 3-5 times per week for at least 30 minutes.   - Injury prevention: discussed safety/seat belts, safety helmets, smoke detectors, carbon monoxide detectors, and smoking near bedding or upholstery.       Depression Screening and Follow-up Plan: Patient was screened for depression during today's encounter. They screened negative with a PHQ-2 score of 0.          History of Present Illness     Adult Annual Physical:  Patient presents for annual physical. 49 year old male presents for annual wellness exam and to establish care. He has not been established with a PCP in >10 years ago. He was seen by [facility] 2 weeks ago to establish care but wishing to transfer services to [facility]. PMH significant for diabetes - which patient relates is controlled when he is on a Keto diet. He was referred to Endocrine by [facility] and was given a continuous blood glucose monitor. He did restart the Keto diet and has noted  significant improvement - initial 's, now ranging in 110's. He is not wishing to start on medications for diabetes at this time. He does have Stage 1 pre-hypertension, managed with diet and exercise. He was given lab scripts (CBC, CMP, lipid panel, Protein/cr ratio) but did not yet complete them. He was also provided a script for cologuard, which has not yet been delivered to his house. He also reports intermittent vertigo with position changes, he suspects it is BPPV. He is requesting referral to PT.He otherwise denies headaches, dizziness, diarrhea, abdominal pain, blood in stool or urine, urinary frequency, increased thirst, or increased hunger.  He denies major concerns and overall feels well. .     Diet and Physical Activity:  - Diet/Nutrition:. Keto  - Exercise: 5-7 times a week on average.    Depression Screening:  - PHQ-2 Score: 0    General Health:  - Sleep: sleeps well.  - Hearing: normal hearing right ear.  - Vision: no vision problems, most recent eye exam < 1 year ago and wears glasses.  - Dental: regular dental visits.     Health:  - History of STDs: no.   - Urinary symptoms: none.     Advanced Care Planning:  - Has an advanced directive?: no    - Has a durable medical POA?: no    - ACP document given to patient?: no      Review of Systems   Constitutional:  Negative for activity change, appetite change, chills, fatigue and fever.   HENT:  Negative for congestion, rhinorrhea and sore throat.    Eyes:  Negative for visual disturbance.   Respiratory:  Negative for cough, chest tightness and shortness of breath.    Cardiovascular:  Negative for chest pain and palpitations.   Gastrointestinal:  Negative for abdominal pain, constipation, diarrhea, nausea and vomiting.   Endocrine: Negative for cold intolerance, heat intolerance, polydipsia, polyphagia and polyuria.   Musculoskeletal:  Negative for arthralgias, back pain, myalgias and neck pain.   Skin:  Negative for color change and pallor.  "  Allergic/Immunologic: Negative for environmental allergies and food allergies.   Neurological:  Positive for dizziness (Intermittent with positional changes). Negative for light-headedness and headaches.     Patient's shoes and socks removed.    Right Foot/Ankle   Right Foot Inspection  Skin Exam: skin normal and skin intact. No dry skin, no warmth, no callus, no erythema, no maceration, no abnormal color, no pre-ulcer, no ulcer and no callus.     Toe Exam: ROM and strength within normal limits.     Sensory   Vibration: intact  Proprioception: intact  Monofilament testing: intact    Vascular  Capillary refills: < 3 seconds  The right DP pulse is 2+. The right PT pulse is 2+.     Left Foot/Ankle  Left Foot Inspection  Skin Exam: skin normal and skin intact. No dry skin, no warmth, no erythema, no maceration, normal color, no pre-ulcer, no ulcer and no callus.     Toe Exam: ROM and strength within normal limits.     Sensory   Vibration: intact  Proprioception: intact  Monofilament testing: intact    Vascular  Capillary refills: < 3 seconds  The left DP pulse is 2+. The left PT pulse is 2+.     Assign Risk Category  No deformity present  No loss of protective sensation  No weak pulses  Risk: 0         Objective   /80   Pulse 97   Ht 5' 4\" (1.626 m)   Wt 69.4 kg (153 lb)   SpO2 98%   BMI 26.26 kg/m²     Physical Exam  Vitals and nursing note reviewed.   Constitutional:       General: He is awake. He is not in acute distress.     Appearance: Normal appearance. He is well-developed and normal weight.   HENT:      Head: Normocephalic and atraumatic.      Right Ear: Hearing, tympanic membrane, ear canal and external ear normal.      Left Ear: Hearing, tympanic membrane, ear canal and external ear normal.      Nose: No congestion or rhinorrhea.      Mouth/Throat:      Lips: Pink.      Mouth: Mucous membranes are moist.      Pharynx: Oropharynx is clear. Uvula midline.   Eyes:      Conjunctiva/sclera: " Conjunctivae normal.   Cardiovascular:      Rate and Rhythm: Normal rate and regular rhythm.      Pulses: Normal pulses. no weak pulses.           Dorsalis pedis pulses are 2+ on the right side and 2+ on the left side.        Posterior tibial pulses are 2+ on the right side and 2+ on the left side.      Heart sounds: Normal heart sounds.   Pulmonary:      Effort: Pulmonary effort is normal.      Breath sounds: Normal breath sounds.   Abdominal:      General: Abdomen is flat. Bowel sounds are normal.      Palpations: Abdomen is soft.      Tenderness: There is no abdominal tenderness. There is no right CVA tenderness, left CVA tenderness or guarding.   Musculoskeletal:      Cervical back: Normal range of motion and neck supple.   Feet:      Right foot:      Skin integrity: No ulcer, skin breakdown, erythema, warmth, callus or dry skin.      Left foot:      Skin integrity: No ulcer, skin breakdown, erythema, warmth, callus or dry skin.   Skin:     General: Skin is warm and dry.   Neurological:      General: No focal deficit present.      Mental Status: He is alert and oriented to person, place, and time.   Psychiatric:         Mood and Affect: Mood normal.         Behavior: Behavior normal. Behavior is cooperative.         Thought Content: Thought content normal.         Judgment: Judgment normal.

## 2025-03-31 NOTE — PATIENT INSTRUCTIONS
"Patient Education     Routine physical for adults   The Basics   Written by the doctors and editors at [facility]   What is a physical? -- A physical is a routine visit, or \"check-up,\" with your doctor. You might also hear it called a \"wellness visit\" or \"preventive visit.\"  During each visit, the doctor will:   Ask about your physical and mental health   Ask about your habits, behaviors, and lifestyle   Do an exam   Give you vaccines if needed   Talk to you about any medicines you take   Give advice about your health   Answer your questions  Getting regular check-ups is an important part of taking care of your health. It can help your doctor find and treat any problems you have. But it's also important for preventing health problems.  A routine physical is different from a \"sick visit.\" A sick visit is when you see a doctor because of a health concern or problem. Since physicals are scheduled ahead of time, you can think about what you want to ask the doctor.  How often should I get a physical? -- It depends on your age and health. In general, for people age 21 years and older:   If you are younger than 50 years, you might be able to get a physical every 3 years.   If you are 50 years or older, your doctor might recommend a physical every year.  If you have an ongoing health condition, like diabetes or high blood pressure, your doctor will probably want to see you more often.  What happens during a physical? -- In general, each visit will include:   Physical exam - The doctor or nurse will check your height, weight, heart rate, and blood pressure. They will also look at your eyes and ears. They will ask about how you are feeling and whether you have any symptoms that bother you.   Medicines - It's a good idea to bring a list of all the medicines you take to each doctor visit. Your doctor will talk to you about your medicines and answer any questions. Tell them if you are having any side effects that bother you. You " "should also tell them if you are having trouble paying for any of your medicines.   Habits and behaviors - This includes:   Your diet   Your exercise habits   Whether you smoke, drink alcohol, or use drugs   Whether you are sexually active   Whether you feel safe at home  Your doctor will talk to you about things you can do to improve your health and lower your risk of health problems. They will also offer help and support. For example, if you want to quit smoking, they can give you advice and might prescribe medicines. If you want to improve your diet or get more physical activity, they can help you with this, too.   Lab tests, if needed - The tests you get will depend on your age and situation. For example, your doctor might want to check your:   Cholesterol   Blood sugar   Iron level   Vaccines - The recommended vaccines will depend on your age, health, and what vaccines you already had. Vaccines are very important because they can prevent certain serious or deadly infections.   Discussion of screening - \"Screening\" means checking for diseases or other health problems before they cause symptoms. Your doctor can recommend screening based on your age, risk, and preferences. This might include tests to check for:   Cancer, such as breast, prostate, cervical, ovarian, colorectal, prostate, lung, or skin cancer   Sexually transmitted infections, such as chlamydia and gonorrhea   Mental health conditions like depression and anxiety  Your doctor will talk to you about the different types of screening tests. They can help you decide which screenings to have. They can also explain what the results might mean.   Answering questions - The physical is a good time to ask the doctor or nurse questions about your health. If needed, they can refer you to other doctors or specialists, too.  Adults older than 65 years often need other care, too. As you get older, your doctor will talk to you about:   How to prevent falling at " home   Hearing or vision tests   Memory testing   How to take your medicines safely   Making sure that you have the help and support you need at home  All topics are updated as new evidence becomes available and our peer review process is complete.  This topic retrieved from globalsch[employer] on: May 02, 2024.  Topic 256472 Version 1.0  Release: 32.4.3 - C32.122  © 2024 UpToDate, Inc. and/or its affiliates. All rights reserved.  Consumer Information Use and Disclaimer   Disclaimer: This generalized information is a limited summary of diagnosis, treatment, and/or medication information. It is not meant to be comprehensive and should be used as a tool to help the user understand and/or assess potential diagnostic and treatment options. It does NOT include all information about conditions, treatments, medications, side effects, or risks that may apply to a specific patient. It is not intended to be medical advice or a substitute for the medical advice, diagnosis, or treatment of a health care provider based on the health care provider's examination and assessment of a patient's specific and unique circumstances. Patients must speak with a health care provider for complete information about their health, medical questions, and treatment options, including any risks or benefits regarding use of medications. This information does not endorse any treatments or medications as safe, effective, or approved for treating a specific patient. UpToDate, Inc. and its affiliates disclaim any warranty or liability relating to this information or the use thereof.The use of this information is governed by the Terms of Use, available at https://www.[employer]ters[employer] Japanuwer.com/en/know/clinical-effectiveness-terms. 2024© UpToDate, Inc. and its affiliates and/or licensors. All rights reserved.  Copyright   © 2024 UpToDate, Inc. and/or its affiliates. All rights reserved.

## 2025-04-01 ENCOUNTER — TELEPHONE (OUTPATIENT)
Age: 50
End: 2025-04-01

## 2025-04-01 DIAGNOSIS — E11.9 TYPE 2 DIABETES MELLITUS WITHOUT COMPLICATION, WITHOUT LONG-TERM CURRENT USE OF INSULIN (HCC): Primary | ICD-10-CM

## 2025-04-01 NOTE — TELEPHONE ENCOUNTER
Patient calls stating aarti javier was seen in the office yesterday and HGA1 C was high. Discussed starting Metformin but she declined at that time. Calls today requesting the doctor send the Metformin in. States she thought about it and would like to start the metformin asap.      Please call the patient when the medication is sent.

## 2025-04-02 ENCOUNTER — NURSE TRIAGE (OUTPATIENT)
Age: 50
End: 2025-04-02

## 2025-04-02 ENCOUNTER — TELEPHONE (OUTPATIENT)
Dept: ADMINISTRATIVE | Facility: OTHER | Age: 50
End: 2025-04-02

## 2025-04-02 DIAGNOSIS — E11.9 TYPE 2 DIABETES MELLITUS WITHOUT COMPLICATION, WITHOUT LONG-TERM CURRENT USE OF INSULIN (HCC): ICD-10-CM

## 2025-04-02 NOTE — TELEPHONE ENCOUNTER
Regarding: medication problem  ----- Message from Edna RAYMUNDO sent at 4/2/2025  1:08 PM EDT -----  Patient called in stating medication was sent to incorrect pharmacy. Patient would like medication sent to [facility] on [address].  Medication - metFORMIN (GLUCOPHAGE) 500 mg tablet  Please advise, thank you

## 2025-04-02 NOTE — TELEPHONE ENCOUNTER
FOLLOW UP: None     REASON FOR CONVERSATION: Medication Problem    SYMPTOMS: N/A    OTHER: Metformin sent to incorrect pharmacy, will resend to [facility].   [id number] [place] - [address]   DISPOSITION: Home Care      Reason for Disposition   Prescription prescribed recently is not at pharmacy and triager has access to patient's EMR and prescription is recorded in the EMR    Answer Assessment - Initial Assessment Questions  metFORMIN (GLUCOPHAGE) 500 mg tablet Take 1 tablet (500 mg total) by mouth 2 (two) times a day with meals          Summary: Take 1 tablet (500 mg total) by mouth 2 (two) times a day with meals, Starting Tue 4/1/2025, Normal  Guidelines: Dose, Route, Frequency: 500 mg, Oral, 2 times daily with mealsStart: 04/01/2025Ord/Sold: 04/01/2025 (O)Ordered On: 04/01/2025  Dx Associated: Taking: Long-term: Med Note:           Ordering Department:  PRIMARY CARE [facility] POD  Authorized By: ANKUSH Garcia  Dispense: 60 tablet  Refills: 2 ordered    Protocols used: Medication Question Call-Adult-[place]

## 2025-04-02 NOTE — TELEPHONE ENCOUNTER
Patient called in to follow up on medication. Advised of Lizz's message and advised medication was sent to [facility]. Patient would like medication sent to [facility] insteas. Call hub Nurse message is being sent to [facility].    SCOTT thank you

## 2025-04-02 NOTE — TELEPHONE ENCOUNTER
----- Message from Batsheva SIFUENTES sent at 4/2/2025  8:15 AM EDT -----  Regarding: care gap request  04/02/25 8:15 AM    Hello, our patient attached above has had a Physical completed/performed. Please assist in updating the patient chart by pulling the document from Encounter Tab within Chart Review. The date of service is 03/13/2025.  Can this be updated on the health maintenance?    Thank you,  Batsheva Chávez PG  FORKS

## 2025-04-02 NOTE — TELEPHONE ENCOUNTER
Upon review of the In Basket request we have noted this is a NON-VALUE BASED item. We are unable to complete this request. Our team has the capability to assist in retrieval, updating, and linking of the following items: Chlamydia, CRC Cologuard, CRC Colonoscopy, CRC CT Colonography, CRC FIT/FOBT(3), CRC Sigmoidoscopy, CT Lung Screening, DEXA Scan, Diabetic Eye Exam, Diabetic Foot Exam, Hemoglobin A1c, Hemoglobin (pediatrics), Hepatitis C, HIV (cannot retrieve), Immunizations, Lead, Mammogram, Medicare AWV, Pap Smear (HPV), and Urine Microalbumin.    Any additional questions or concerns should be emailed to the Practice Liaisons via the appropriate education email address, please do not reply via In Basket.    Thank you  Giovana Viveros MA   [facility] VALUE BASED VIR

## 2025-04-09 ENCOUNTER — OFFICE VISIT (OUTPATIENT)
Age: 50
End: 2025-04-09

## 2025-04-09 DIAGNOSIS — H43.823 VITREOMACULAR ADHESION OF BOTH EYES: ICD-10-CM

## 2025-04-09 DIAGNOSIS — E11.9 TYPE 2 DIABETES MELLITUS WITHOUT RETINOPATHY (HCC): Primary | ICD-10-CM

## 2025-04-09 NOTE — PROGRESS NOTES
Name: Marlee Cody      : 1975      MRN: [id number]  Encounter Provider: Beatriz Chauhan MD  Encounter Date: 2025   Encounter department: [facility] OPHTHALMOLOGY  :  Assessment & Plan  Type 2 diabetes mellitus without retinopathy (HCC)    Lab Results   Component Value Date    HGBA1C 9.4 (A) 2025     No diabetic retinopathy on exam, no macular edema, no NV. The importance of proper blood sugar, blood lipids, and blood pressure control for minimizing the risk of vision loss due to retinopathy associated with diabetes mellitus and hypertension was discussed with the patient. Stressed the importance of following up for monitoring and management by a primary care physician.       Orders:    OCT, Retina - OU - Both Eyes    Fundus Photos - OU - Both Eyes    Vitreomacular adhesion of both eyes    Lab Results   Component Value Date    HGBA1C 9.4 (A) 2025     Pt has syneresis; No retinal tears, breaks, or detachments on dilated examination; Recommend monitoring; Retinal detachment precautions were discussed with the patient. The patient was instructed to call or return immediately for an increase in flashes of light, floaters (dark spots in vision), or curtaining of the visual field.     Orders:    Ambulatory Referral to Ophthalmology            Marlee Cody is a 50 y.o. male who presents today for Diabetic Eye exam. Pt is a type 2 diabetic since 2016. He was just started on Metformin a few days ago prior to that he was diet controlled. He reports intermittent blurry vision OU. His last ha1c was 9.4. BS was 160 after lunch today. He has a lesion LLL that has been bothering him and getting bigger. He is interested in referral for removal   History obtained from: patient    Review of Systems  Medical History Reviewed by provider this encounter:  Tobacco  Allergies  Meds  Problems  Med Hx  Surg Hx  Fam Hx     .     Past Ocular History: None  Ocular Meds/Drops: None    Base Eye Exam       Visual  Acuity (Snellen - Linear)         Right Left    Dist sc 20/20 20/25              Tonometry (Applanation, 12:23 PM)         Right Left    Pressure 12 12              Pupils         Pupils Dark APD    Right PERRL 3 -    Left PERRL 3 -              Visual Fields         Left Right     Full Full              Extraocular Movement         Right Left     Full, Ortho Full, Ortho              Neuro/Psych       Oriented x3: Yes              Dilation       Both eyes: 2.5% Phenylephrine, 1% Tropicamide @ 12:23 PM                  Slit Lamp and Fundus Exam       External Exam         Right Left    External Normal Normal              Slit Lamp Exam         Right Left    Lids/Lashes Normal LLL Lesion    Conjunctiva/Sclera White and quiet White and quiet    Cornea arcus arcus    Anterior Chamber Deep and quiet Deep and quiet    Iris Round and reactive Round and reactive    Lens clear clear    Anterior Vitreous No PVD, clear, no cell No PVD, clear, no cell              Fundus Exam         Right Left    Disc sharp, no pallor sharp, no pallor    C/D Ratio 0.25 0.25    Macula flat/no heme / good foveal reflex flat/no heme / good foveal reflex    Vessels normal in caliber normal in caliber    Periphery flat, no retinal tear or detachment flat, no retinal tear or detachment                      IMAGING:  OCT, Retina - OU - Both Eyes          Right Eye  Quality was good. Findings include (Vitreomacular adhesion).     Left Eye  Quality was good. Findings include (Vitreomacular adhesion).          Fundus Photos - OU - Both Eyes          Right Eye  Disc findings include normal observations. Macula findings include normal observations. Vessel findings include normal observations. Periphery findings include normal observations.     Left Eye  Disc findings include normal observations. Macula findings include normal observations. Vessel findings include normal observations. Periphery findings include normal observations.

## 2025-04-09 NOTE — ASSESSMENT & PLAN NOTE
Lab Results   Component Value Date    HGBA1C 9.4 (A) 03/31/2025     Pt has syneresis; No retinal tears, breaks, or detachments on dilated examination; Recommend monitoring; Retinal detachment precautions were discussed with the patient. The patient was instructed to call or return immediately for an increase in flashes of light, floaters (dark spots in vision), or curtaining of the visual field.     Orders:    Ambulatory Referral to Ophthalmology

## 2025-05-06 ENCOUNTER — TELEPHONE (OUTPATIENT)
Age: 50
End: 2025-05-06

## 2025-05-06 NOTE — TELEPHONE ENCOUNTER
Pt called the [facility] office in error trying to reach endocrinology.  A soft transfer was completed to endocrinology for further assitance

## 2025-05-20 ENCOUNTER — LAB (OUTPATIENT)
Dept: LAB | Facility: CLINIC | Age: 50
End: 2025-05-20
Payer: COMMERCIAL

## 2025-05-20 DIAGNOSIS — Z13.220 SCREENING FOR HYPERLIPIDEMIA: ICD-10-CM

## 2025-05-20 DIAGNOSIS — R03.0 ELEVATED BLOOD PRESSURE READING: ICD-10-CM

## 2025-05-20 DIAGNOSIS — Z13.0 SCREENING FOR DEFICIENCY ANEMIA: ICD-10-CM

## 2025-05-20 DIAGNOSIS — E11.9 TYPE 2 DIABETES MELLITUS WITHOUT COMPLICATION, WITHOUT LONG-TERM CURRENT USE OF INSULIN (HCC): ICD-10-CM

## 2025-05-20 LAB
ALBUMIN SERPL BCG-MCNC: 4.6 G/DL (ref 3.5–5)
ALP SERPL-CCNC: 56 U/L (ref 34–104)
ALT SERPL W P-5'-P-CCNC: 24 U/L (ref 7–52)
ANION GAP SERPL CALCULATED.3IONS-SCNC: 11 MMOL/L (ref 4–13)
AST SERPL W P-5'-P-CCNC: 22 U/L (ref 13–39)
BASOPHILS # BLD AUTO: 0.04 THOUSANDS/ÂΜL (ref 0–0.1)
BASOPHILS NFR BLD AUTO: 1 % (ref 0–1)
BILIRUB SERPL-MCNC: 0.52 MG/DL (ref 0.2–1)
BUN SERPL-MCNC: 12 MG/DL (ref 5–25)
CALCIUM SERPL-MCNC: 9.6 MG/DL (ref 8.4–10.2)
CHLORIDE SERPL-SCNC: 100 MMOL/L (ref 96–108)
CHOLEST SERPL-MCNC: 176 MG/DL (ref ?–200)
CO2 SERPL-SCNC: 28 MMOL/L (ref 21–32)
CREAT SERPL-MCNC: 0.81 MG/DL (ref 0.6–1.3)
CREAT UR-MCNC: 201.7 MG/DL
EOSINOPHIL # BLD AUTO: 0.28 THOUSAND/ÂΜL (ref 0–0.61)
EOSINOPHIL NFR BLD AUTO: 4 % (ref 0–6)
ERYTHROCYTE [DISTWIDTH] IN BLOOD BY AUTOMATED COUNT: 13.2 % (ref 11.6–15.1)
GFR SERPL CREATININE-BSD FRML MDRD: 102 ML/MIN/1.73SQ M
GLUCOSE P FAST SERPL-MCNC: 152 MG/DL (ref 65–99)
HCT VFR BLD AUTO: 48.3 % (ref 36.5–49.3)
HDLC SERPL-MCNC: 40 MG/DL
HGB BLD-MCNC: 15.7 G/DL (ref 12–17)
IMM GRANULOCYTES # BLD AUTO: 0.02 THOUSAND/UL (ref 0–0.2)
IMM GRANULOCYTES NFR BLD AUTO: 0 % (ref 0–2)
LDLC SERPL CALC-MCNC: 113 MG/DL (ref 0–100)
LYMPHOCYTES # BLD AUTO: 2.05 THOUSANDS/ÂΜL (ref 0.6–4.47)
LYMPHOCYTES NFR BLD AUTO: 32 % (ref 14–44)
MCH RBC QN AUTO: 28.1 PG (ref 26.8–34.3)
MCHC RBC AUTO-ENTMCNC: 32.5 G/DL (ref 31.4–37.4)
MCV RBC AUTO: 86 FL (ref 82–98)
MICROALBUMIN UR-MCNC: 9 MG/L
MICROALBUMIN/CREAT 24H UR: 4 MG/G CREATININE (ref 0–30)
MONOCYTES # BLD AUTO: 0.48 THOUSAND/ÂΜL (ref 0.17–1.22)
MONOCYTES NFR BLD AUTO: 8 % (ref 4–12)
NEUTROPHILS # BLD AUTO: 3.48 THOUSANDS/ÂΜL (ref 1.85–7.62)
NEUTS SEG NFR BLD AUTO: 55 % (ref 43–75)
NRBC BLD AUTO-RTO: 0 /100 WBCS
PLATELET # BLD AUTO: 183 THOUSANDS/UL (ref 149–390)
PMV BLD AUTO: 12.2 FL (ref 8.9–12.7)
POTASSIUM SERPL-SCNC: 4.3 MMOL/L (ref 3.5–5.3)
PROT SERPL-MCNC: 7.4 G/DL (ref 6.4–8.4)
RBC # BLD AUTO: 5.59 MILLION/UL (ref 3.88–5.62)
SODIUM SERPL-SCNC: 139 MMOL/L (ref 135–147)
TRIGL SERPL-MCNC: 117 MG/DL (ref ?–150)
WBC # BLD AUTO: 6.35 THOUSAND/UL (ref 4.31–10.16)

## 2025-05-20 PROCEDURE — 36415 COLL VENOUS BLD VENIPUNCTURE: CPT

## 2025-05-20 PROCEDURE — 85025 COMPLETE CBC W/AUTO DIFF WBC: CPT

## 2025-05-20 PROCEDURE — 82570 ASSAY OF URINE CREATININE: CPT

## 2025-05-20 PROCEDURE — 80061 LIPID PANEL: CPT

## 2025-05-20 PROCEDURE — 80053 COMPREHEN METABOLIC PANEL: CPT

## 2025-05-20 PROCEDURE — 82043 UR ALBUMIN QUANTITATIVE: CPT

## 2025-07-01 ENCOUNTER — OFFICE VISIT (OUTPATIENT)
Dept: FAMILY MEDICINE CLINIC | Facility: CLINIC | Age: 50
End: 2025-07-01
Payer: COMMERCIAL

## 2025-07-01 VITALS
SYSTOLIC BLOOD PRESSURE: 122 MMHG | OXYGEN SATURATION: 98 % | WEIGHT: 152 LBS | BODY MASS INDEX: 25.95 KG/M2 | HEART RATE: 97 BPM | DIASTOLIC BLOOD PRESSURE: 78 MMHG | HEIGHT: 64 IN

## 2025-07-01 DIAGNOSIS — Z53.20 STATIN DECLINED: ICD-10-CM

## 2025-07-01 DIAGNOSIS — Z23 ENCOUNTER FOR IMMUNIZATION: ICD-10-CM

## 2025-07-01 DIAGNOSIS — E78.5 HYPERLIPIDEMIA LDL GOAL <70: ICD-10-CM

## 2025-07-01 DIAGNOSIS — Z12.5 PROSTATE CANCER SCREENING: ICD-10-CM

## 2025-07-01 DIAGNOSIS — Z76.89 ENCOUNTER TO ESTABLISH CARE: Primary | ICD-10-CM

## 2025-07-01 DIAGNOSIS — Z00.00 ANNUAL PHYSICAL EXAM: ICD-10-CM

## 2025-07-01 DIAGNOSIS — Z28.21 PNEUMOCOCCAL VACCINATION DECLINED BY PATIENT: ICD-10-CM

## 2025-07-01 DIAGNOSIS — E11.9 TYPE 2 DIABETES MELLITUS WITHOUT COMPLICATION, WITHOUT LONG-TERM CURRENT USE OF INSULIN (HCC): ICD-10-CM

## 2025-07-01 DIAGNOSIS — Z12.11 COLON CANCER SCREENING: ICD-10-CM

## 2025-07-01 LAB — SL AMB POCT HEMOGLOBIN AIC: 7.5 (ref ?–6.5)

## 2025-07-01 PROCEDURE — 90715 TDAP VACCINE 7 YRS/> IM: CPT | Performed by: FAMILY MEDICINE

## 2025-07-01 PROCEDURE — 99214 OFFICE O/P EST MOD 30 MIN: CPT | Performed by: FAMILY MEDICINE

## 2025-07-01 PROCEDURE — 99396 PREV VISIT EST AGE 40-64: CPT | Performed by: FAMILY MEDICINE

## 2025-07-01 PROCEDURE — 90471 IMMUNIZATION ADMIN: CPT | Performed by: FAMILY MEDICINE

## 2025-07-01 PROCEDURE — 83036 HEMOGLOBIN GLYCOSYLATED A1C: CPT | Performed by: FAMILY MEDICINE

## 2025-07-01 NOTE — ASSESSMENT & PLAN NOTE
- A1c = 7.5 <-- 9.4   -   - stable BP in office today   - unable to tolerate Metformin   - declined DM education   - has been referred to Endo but has missed 2appts 2/2 work - would like to see Specialist and requesting Fasting Insulin levels - referral and script given   - DM foot exam as documented below   - UTD with DM eye exam with [facility]   - declined PCV20 in the office today   - does not get annual Flu vaccines   - will do a trial of Januvia 25mg QD to start for now   - diet/exercise  - RTO in 3months, with repeat labs, for f/u - pt aware and agreeable   Lab Results   Component Value Date    HGBA1C 7.5 (A) 07/01/2025     Orders:    POCT hemoglobin A1c    Hemoglobin A1C; Future    TSH, 3rd generation with Free T4 reflex    Ambulatory Referral to Endocrinology; Future    Insulin, fasting; Future    sitaGLIPtin (JANUVIA) 25 mg tablet; Take 1 tablet (25 mg total) by mouth daily

## 2025-07-01 NOTE — PATIENT INSTRUCTIONS
"Patient Education     Routine physical for adults   The Basics   Written by the doctors and editors at [facility]   What is a physical? -- A physical is a routine visit, or \"check-up,\" with your doctor. You might also hear it called a \"wellness visit\" or \"preventive visit.\"  During each visit, the doctor will:   Ask about your physical and mental health   Ask about your habits, behaviors, and lifestyle   Do an exam   Give you vaccines if needed   Talk to you about any medicines you take   Give advice about your health   Answer your questions  Getting regular check-ups is an important part of taking care of your health. It can help your doctor find and treat any problems you have. But it's also important for preventing health problems.  A routine physical is different from a \"sick visit.\" A sick visit is when you see a doctor because of a health concern or problem. Since physicals are scheduled ahead of time, you can think about what you want to ask the doctor.  How often should I get a physical? -- It depends on your age and health. In general, for people age 21 years and older:   If you are younger than 50 years, you might be able to get a physical every 3 years.   If you are 50 years or older, your doctor might recommend a physical every year.  If you have an ongoing health condition, like diabetes or high blood pressure, your doctor will probably want to see you more often.  What happens during a physical? -- In general, each visit will include:   Physical exam - The doctor or nurse will check your height, weight, heart rate, and blood pressure. They will also look at your eyes and ears. They will ask about how you are feeling and whether you have any symptoms that bother you.   Medicines - It's a good idea to bring a list of all the medicines you take to each doctor visit. Your doctor will talk to you about your medicines and answer any questions. Tell them if you are having any side effects that bother you. You " "should also tell them if you are having trouble paying for any of your medicines.   Habits and behaviors - This includes:   Your diet   Your exercise habits   Whether you smoke, drink alcohol, or use drugs   Whether you are sexually active   Whether you feel safe at home  Your doctor will talk to you about things you can do to improve your health and lower your risk of health problems. They will also offer help and support. For example, if you want to quit smoking, they can give you advice and might prescribe medicines. If you want to improve your diet or get more physical activity, they can help you with this, too.   Lab tests, if needed - The tests you get will depend on your age and situation. For example, your doctor might want to check your:   Cholesterol   Blood sugar   Iron level   Vaccines - The recommended vaccines will depend on your age, health, and what vaccines you already had. Vaccines are very important because they can prevent certain serious or deadly infections.   Discussion of screening - \"Screening\" means checking for diseases or other health problems before they cause symptoms. Your doctor can recommend screening based on your age, risk, and preferences. This might include tests to check for:   Cancer, such as breast, prostate, cervical, ovarian, colorectal, prostate, lung, or skin cancer   Sexually transmitted infections, such as chlamydia and gonorrhea   Mental health conditions like depression and anxiety  Your doctor will talk to you about the different types of screening tests. They can help you decide which screenings to have. They can also explain what the results might mean.   Answering questions - The physical is a good time to ask the doctor or nurse questions about your health. If needed, they can refer you to other doctors or specialists, too.  Adults older than 65 years often need other care, too. As you get older, your doctor will talk to you about:   How to prevent falling at " home   Hearing or vision tests   Memory testing   How to take your medicines safely   Making sure that you have the help and support you need at home  All topics are updated as new evidence becomes available and our peer review process is complete.  This topic retrieved from [employer] on: May 02, 2024.  Topic 849418 Version 1.0  Release: 32.4.3 - C32.122  © 2024 UpToDate, Inc. and/or its affiliates. All rights reserved.  Consumer Information Use and Disclaimer   Disclaimer: This generalized information is a limited summary of diagnosis, treatment, and/or medication information. It is not meant to be comprehensive and should be used as a tool to help the user understand and/or assess potential diagnostic and treatment options. It does NOT include all information about conditions, treatments, medications, side effects, or risks that may apply to a specific patient. It is not intended to be medical advice or a substitute for the medical advice, diagnosis, or treatment of a health care provider based on the health care provider's examination and assessment of a patient's specific and unique circumstances. Patients must speak with a health care provider for complete information about their health, medical questions, and treatment options, including any risks or benefits regarding use of medications. This information does not endorse any treatments or medications as safe, effective, or approved for treating a specific patient. UpToDate, Inc. and its affiliates disclaim any warranty or liability relating to this information or the use thereof.The use of this information is governed by the Terms of Use, available at https://www.w[employer]ters[employer]rapeuticsuwer.com/en/know/clinical-effectiveness-terms. 2024© UpToDate, Inc. and its affiliates and/or licensors. All rights reserved.  Copyright   © 2024 UpToDate, Inc. and/or its affiliates. All rights reserved.

## 2025-07-01 NOTE — PROGRESS NOTES
Adult Annual Physical  Name: Marlee Cody      : 1975      MRN: [id number]  Encounter Provider: Daiana Powell DO  Encounter Date: 2025   Encounter department: [facility]    :  Assessment & Plan  Encounter to establish care  - switching PCP in the office        Annual physical exam  - reviewed PMHx, PSHx, meds, allergies, Fhx, Soc Hx and Sexual Hx  - does not get Flu vaccines  - will think about PCV20 - will revisit at next OV   - received Tdap Booster in office today   - overdue for Colon Ca screening - options d/w pt - pt interested in Cologuard - script given   - interested in Prostate Ca screening - script given   - declined STD screening in the office today   - reviewed labs done 2025 - see below   - discussed diet and encouraged exercise  - UTD with Ophtho   - overdue for Dental - advised to schedule   - RTO in 1yr for annual exam        Encounter for immunization    Orders:    TDAP VACCINE GREATER THAN OR EQUAL TO 8YO IM    Pneumococcal vaccination declined by patient         Type 2 diabetes mellitus without complication, without long-term current use of insulin (HCC)  - A1c = 7.5 <-- 9.4   -   - stable BP in office today   - unable to tolerate Metformin   - declined DM education   - has been referred to Endo but has missed 2appts 2/2 work - would like to see Specialist and requesting Fasting Insulin levels - referral and script given   - DM foot exam as documented below   - UTD with DM eye exam with [facility]   - declined PCV20 in the office today   - does not get annual Flu vaccines   - will do a trial of Januvia 25mg QD to start for now   - diet/exercise  - RTO in 3months, with repeat labs, for f/u - pt aware and agreeable   Lab Results   Component Value Date    HGBA1C 7.5 (A) 2025     Orders:    POCT hemoglobin A1c    Hemoglobin A1C; Future    TSH, 3rd generation with Free T4 reflex    Ambulatory Referral to Endocrinology; Future    Insulin, fasting;  Future    sitaGLIPtin (JANUVIA) 25 mg tablet; Take 1 tablet (25 mg total) by mouth daily    Hyperlipidemia LDL goal <70  -  - no other labs with which to compare  - pt declined Statin at today's OV   - RTO in 3months, with repeat labs, for f/u - pt aware and agreeable   - The 10-year ASCVD risk score (Justus MASSEY, et al., 2019) is: 7.1%    Values used to calculate the score:      Age: 50 years      Clincally relevant sex: Male      Is Non- : No      Diabetic: Yes      Tobacco smoker: No      Systolic Blood Pressure: 122 mmHg      Is BP treated: No      HDL Cholesterol: 40 mg/dL      Total Cholesterol: 176 mg/dL  Orders:    Lipid panel; Future    Statin declined         Colon cancer screening  - overdue for Colon Ca screening - options d/w pt - pt interested in Cologuard - script given   Orders:    Cologuard    Prostate cancer screening    Orders:    PSA, total and free; Future               Immunizations:  - Immunizations due: Prevnar 20         History of Present Illness     Adult Annual Physical:  Patient presents for annual physical.   Marlee Cody is a 50 y.o. male who presents to the office to establish care and for an annual exam  1) Establish/Annual   - prior PCP: switching PCPs in the office   - PMHx: hemorrhoids, DM2  - allergies: NKDA  - Meds: see med rec  - PSHx: none   - FHx: M (DM, HD), F (DM, HD, CVA - 64yo), denies Fhx of colon or prostate ca   - Immunizations: does not get Flu vaccines, will think about PCV20, will update Tdap in office today   - Hm: overdue for Colon Ca screening - options d/w pt - pt interested in Cologuard, interested in Prostate Ca screening   - diet/exercise: active, balanced diet - eats BID   - social: denies tob/EtOH   - sexual Hx: active with wife, declined STD screening in the office today   - last vision: [facility] - goes annually   - last dental: overdue   2) DM2  - A1c in office = 7.5 <-- 9.4   -    - currently on Metformin  "500mg BID -- but unable to tolerate and stopped taking  - declined Statin in the office today    - declined PCV20 in the office today   - has missed Endo appt x2   - wants to get Fasting Insulin levels checked   - UTD with Ophtho   - declined DM education in the office today   - ROS: today in the office pt denies F/C/N/V/HA/visual changes/CP/palpitations/SOB/wheezing/abd pain/D/LE edema.     Diet and Physical Activity:  - Diet/Nutrition: well balanced diet.  - Exercise: moderate cardiovascular exercise.    General Health:  - Sleep: sleeps well.  - Hearing: normal hearing bilateral ears.  - Vision: most recent eye exam < 1 year ago.  - Dental: no dental visits for > 1 year.    /GYN Health:    - History of STDs: no     Health:  - History of STDs: no.     Advanced Care Planning:  - Has an advanced directive?: no    - Has a durable medical POA?: no      Review of Systems as per HPI       Objective   /78   Pulse 97   Ht 5' 4\" (1.626 m)   Wt 68.9 kg (152 lb)   SpO2 98%   BMI 26.09 kg/m²     Physical Exam  Vitals reviewed.   Constitutional:       General: He is not in acute distress.     Appearance: Normal appearance. He is not ill-appearing, toxic-appearing or diaphoretic.   HENT:      Head: Normocephalic and atraumatic.      Right Ear: External ear normal.      Left Ear: External ear normal.      Nose: Nose normal.     Eyes:      General: No scleral icterus.        Right eye: No discharge.         Left eye: No discharge.      Extraocular Movements: Extraocular movements intact.      Conjunctiva/sclera: Conjunctivae normal.       Cardiovascular:      Rate and Rhythm: Normal rate and regular rhythm.      Pulses: no weak pulses.           Dorsalis pedis pulses are 2+ on the right side and 2+ on the left side.      Heart sounds: Normal heart sounds.   Pulmonary:      Effort: Pulmonary effort is normal. No respiratory distress.      Breath sounds: Normal breath sounds. No stridor. No wheezing, rhonchi or rales. "   Abdominal:      Palpations: Abdomen is soft.     Musculoskeletal:         General: Normal range of motion.      Cervical back: Normal range of motion.      Right lower leg: No edema.      Left lower leg: No edema.   Feet:      Right foot:      Skin integrity: No ulcer, skin breakdown, erythema, warmth, callus or dry skin.      Left foot:      Skin integrity: No ulcer, skin breakdown, erythema, warmth, callus or dry skin.     Skin:     General: Skin is warm.     Neurological:      General: No focal deficit present.      Mental Status: He is alert and oriented to person, place, and time.     Psychiatric:         Mood and Affect: Mood normal.         Behavior: Behavior normal.           Patient's shoes and socks removed.    Right Foot/Ankle   Right Foot Inspection  Skin Exam: skin normal and skin intact. No dry skin, no warmth, no callus, no erythema, no maceration, no abnormal color, no pre-ulcer, no ulcer and no callus.     Toe Exam: ROM and strength within normal limits.     Sensory   Monofilament testing: intact    Vascular  The right DP pulse is 2+.     Left Foot/Ankle  Left Foot Inspection  Skin Exam: skin normal and skin intact. No dry skin, no warmth, no erythema, no maceration, normal color, no pre-ulcer, no ulcer and no callus.     Toe Exam: ROM and strength within normal limits.     Sensory   Monofilament testing: intact    Vascular  The left DP pulse is 2+.     Assign Risk Category  No deformity present  No loss of protective sensation  No weak pulses  Risk: 0

## 2025-07-02 ENCOUNTER — TELEPHONE (OUTPATIENT)
Dept: ADMINISTRATIVE | Facility: OTHER | Age: 50
End: 2025-07-02

## 2025-07-02 NOTE — TELEPHONE ENCOUNTER
Upon review of the In Basket request we were able to identify that the patient had the requested item(s) completed internally and we are unable to update. Please send the example to the appropriate education email address for assistance.     Any additional questions or concerns should be emailed to the Practice Liaisons via the appropriate education email address, please do not reply via In Basket.    Thank you  Jesus Gooden MA   [facility]

## 2025-07-02 NOTE — TELEPHONE ENCOUNTER
----- Message from Marianna WATTS sent at 7/1/2025  1:41 PM EDT -----  Pt UTD with DM eye exam = [facility] exam done 4/9/2025 -- please update [employer]   Negative

## 2025-07-03 ENCOUNTER — TELEPHONE (OUTPATIENT)
Age: 50
End: 2025-07-03

## 2025-07-03 NOTE — TELEPHONE ENCOUNTER
The patient called to request   metFORMIN (GLUCOPHAGE) 500 mg tablet  again since the medication JANUVIA is very expensive

## 2025-07-07 DIAGNOSIS — E11.9 TYPE 2 DIABETES MELLITUS WITHOUT COMPLICATION, WITHOUT LONG-TERM CURRENT USE OF INSULIN (HCC): Primary | ICD-10-CM

## 2025-07-07 RX ORDER — METFORMIN HYDROCHLORIDE 500 MG/1
500 TABLET, EXTENDED RELEASE ORAL 2 TIMES DAILY WITH MEALS
Qty: 200 TABLET | Refills: 0 | Status: SHIPPED | OUTPATIENT
Start: 2025-07-07 | End: 2025-07-10 | Stop reason: CLARIF

## 2025-07-10 ENCOUNTER — NURSE TRIAGE (OUTPATIENT)
Age: 50
End: 2025-07-10

## 2025-07-10 DIAGNOSIS — E11.9 TYPE 2 DIABETES MELLITUS WITHOUT COMPLICATION, WITHOUT LONG-TERM CURRENT USE OF INSULIN (HCC): ICD-10-CM

## 2025-07-10 RX ORDER — METFORMIN HYDROCHLORIDE 500 MG/1
500 TABLET, EXTENDED RELEASE ORAL 2 TIMES DAILY WITH MEALS
Qty: 200 TABLET | Refills: 0 | Status: SHIPPED | OUTPATIENT
Start: 2025-07-10

## 2025-07-10 NOTE — TELEPHONE ENCOUNTER
"Reason for Disposition  • Prescription prescribed recently is not at pharmacy and triager has access to patient's EMR and prescription is recorded in the EMR    Answer Assessment - Initial Assessment Questions  1. NAME of MEDICINE: \"What medicine(s) are you calling about?\"      Metformin 500 mg   2. QUESTION: \"What is your question?\" (e.g., double dose of medicine, side effect)      [facility]   3. PRESCRIBER: \"Who prescribed the medicine?\" Reason: if prescribed by specialist, call should be referred to that group.      Dr. Powell.    Protocols used: Medication Question Call-Adult-[place]    "

## 2025-07-10 NOTE — TELEPHONE ENCOUNTER
"Regarding: Medication Issue  ----- Message from Zoraida SANDRA sent at 7/10/2025  8:36 AM EDT -----  Patient called and stated, \"I called last week to have my medication refilled and the pharmacy does not have it.\"   [facility]formin [facility] to [facility] please send to the [id number] she is out of medication    Medication  metFORMIN (GLUCOPHAGE-XR) 500 mg 24 hr tablet [[id number]]  metFORMIN (GLUCOPHAGE-XR) 500 mg 24 hr tablet [[id number]]    Order Details  Dose: 500 mg Route: Oral Frequency: 2 times daily with meals  Dispense Quantity: 200 tablet (100 day supply) Refills: 0   Duration: -- Dispense As Written: No        Sig: Take 1 tablet (500 mg total) by mouth 2 (two) times a day with meals       Start Date: 07/07/25 End Date: --  Written Date: 07/07/25 Expiration Date: 07/07/26     Associated Diagnoses: Type 2 diabetes mellitus without complication, without long-term current use of insulin (HCC) [E11.9]  Providers    Authorizing Provider:  Daiana Powell DO  [address] [address]  Phone: [phone]   Fax: [phone]  CHASITY #: [id number]   NPI: [id number]  --    "